# Patient Record
Sex: MALE | Race: WHITE | Employment: UNEMPLOYED
[De-identification: names, ages, dates, MRNs, and addresses within clinical notes are randomized per-mention and may not be internally consistent; named-entity substitution may affect disease eponyms.]

---
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
## 2021-02-05 ENCOUNTER — TELEMEDICINE (OUTPATIENT)
Dept: PEDIATRICS CLINIC | Age: 21
End: 2021-02-05

## 2021-02-05 DIAGNOSIS — R43.0 LOSS OF SMELL: ICD-10-CM

## 2021-02-05 DIAGNOSIS — R43.2 LOSS OF TASTE: ICD-10-CM

## 2021-02-05 DIAGNOSIS — F43.23 ADJUSTMENT DISORDER WITH MIXED ANXIETY AND DEPRESSED MOOD: Primary | ICD-10-CM

## 2021-02-05 DIAGNOSIS — J30.2 SEASONAL ALLERGIES: ICD-10-CM

## 2021-02-05 PROCEDURE — 99212 OFFICE O/P EST SF 10 MIN: CPT | Performed by: NURSE PRACTITIONER

## 2021-02-05 RX ORDER — SERTRALINE HYDROCHLORIDE 100 MG/1
100 TABLET, FILM COATED ORAL DAILY
Qty: 30 TABLET | Refills: 3 | Status: SHIPPED | OUTPATIENT
Start: 2021-02-05 | End: 2021-07-09 | Stop reason: SDUPTHER

## 2021-02-05 RX ORDER — FLUTICASONE PROPIONATE 50 MCG
2 SPRAY, SUSPENSION (ML) NASAL DAILY
Qty: 1 BOTTLE | Refills: 3 | Status: SHIPPED | OUTPATIENT
Start: 2021-02-05

## 2021-02-05 ASSESSMENT — ENCOUNTER SYMPTOMS: ABDOMINAL PAIN: 0
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
## 2021-04-01 ENCOUNTER — TELEMEDICINE (OUTPATIENT)
Dept: PEDIATRICS CLINIC | Age: 21
End: 2021-04-01
Payer: COMMERCIAL

## 2021-04-01 DIAGNOSIS — F43.23 ADJUSTMENT DISORDER WITH MIXED ANXIETY AND DEPRESSED MOOD: Primary | ICD-10-CM

## 2021-04-01 DIAGNOSIS — G47.9 SLEEPING DIFFICULTY: ICD-10-CM

## 2021-04-01 DIAGNOSIS — Z91.09 ENVIRONMENTAL ALLERGIES: ICD-10-CM

## 2021-04-01 DIAGNOSIS — L70.0 ACNE VULGARIS: ICD-10-CM

## 2021-04-01 PROCEDURE — 99213 OFFICE O/P EST LOW 20 MIN: CPT | Performed by: NURSE PRACTITIONER

## 2021-04-01 PROCEDURE — G8428 CUR MEDS NOT DOCUMENT: HCPCS | Performed by: NURSE PRACTITIONER

## 2021-04-01 ASSESSMENT — ENCOUNTER SYMPTOMS: ABDOMINAL PAIN: 0
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
## 2021-07-09 ENCOUNTER — TELEMEDICINE (OUTPATIENT)
Dept: PEDIATRICS CLINIC | Age: 21
End: 2021-07-09
Payer: COMMERCIAL

## 2021-07-09 DIAGNOSIS — L70.0 ACNE VULGARIS: ICD-10-CM

## 2021-07-09 DIAGNOSIS — F43.23 ADJUSTMENT DISORDER WITH MIXED ANXIETY AND DEPRESSED MOOD: ICD-10-CM

## 2021-07-09 DIAGNOSIS — Z91.09 ENVIRONMENTAL ALLERGIES: ICD-10-CM

## 2021-07-09 PROCEDURE — 99213 OFFICE O/P EST LOW 20 MIN: CPT | Performed by: NURSE PRACTITIONER

## 2021-07-09 PROCEDURE — G8427 DOCREV CUR MEDS BY ELIG CLIN: HCPCS | Performed by: NURSE PRACTITIONER

## 2021-07-09 RX ORDER — MONTELUKAST SODIUM 10 MG/1
10 TABLET ORAL NIGHTLY
Qty: 90 TABLET | Refills: 1 | Status: SHIPPED | OUTPATIENT
Start: 2021-07-09

## 2021-07-09 RX ORDER — HYDROXYZINE PAMOATE 25 MG/1
CAPSULE ORAL
Qty: 60 CAPSULE | Refills: 3 | Status: SHIPPED | OUTPATIENT
Start: 2021-07-09 | End: 2022-07-07

## 2021-07-09 RX ORDER — DOXYCYCLINE HYCLATE 100 MG/1
CAPSULE ORAL
Qty: 30 CAPSULE | Refills: 2 | Status: SHIPPED | OUTPATIENT
Start: 2021-07-09 | End: 2022-06-06

## 2021-07-09 RX ORDER — SERTRALINE HYDROCHLORIDE 100 MG/1
100 TABLET, FILM COATED ORAL DAILY
Qty: 30 TABLET | Refills: 3 | Status: SHIPPED | OUTPATIENT
Start: 2021-07-09 | End: 2022-08-29

## 2021-08-01 ASSESSMENT — ENCOUNTER SYMPTOMS: ABDOMINAL PAIN: 0

## 2021-10-11 ENCOUNTER — HOSPITAL ENCOUNTER (OUTPATIENT)
Age: 21
Setting detail: SPECIMEN
Discharge: HOME | End: 2021-10-11
Payer: COMMERCIAL

## 2021-10-11 ENCOUNTER — OFFICE VISIT (OUTPATIENT)
Dept: PEDIATRICS CLINIC | Age: 21
End: 2021-10-11
Payer: COMMERCIAL

## 2021-10-11 VITALS
DIASTOLIC BLOOD PRESSURE: 82 MMHG | HEIGHT: 69 IN | HEART RATE: 92 BPM | TEMPERATURE: 97.6 F | BODY MASS INDEX: 28.66 KG/M2 | WEIGHT: 193.5 LBS | SYSTOLIC BLOOD PRESSURE: 136 MMHG

## 2021-10-11 DIAGNOSIS — J45.30 MILD PERSISTENT REACTIVE AIRWAY DISEASE WITHOUT COMPLICATION: ICD-10-CM

## 2021-10-11 DIAGNOSIS — Z23 NEED FOR VACCINATION: ICD-10-CM

## 2021-10-11 DIAGNOSIS — Z91.09 ENVIRONMENTAL ALLERGIES: ICD-10-CM

## 2021-10-11 DIAGNOSIS — Z00.129 HEALTH CHECK FOR CHILD OVER 28 DAYS OLD: Primary | ICD-10-CM

## 2021-10-11 DIAGNOSIS — Z00.129 HEALTH CHECK FOR CHILD OVER 28 DAYS OLD: ICD-10-CM

## 2021-10-11 DIAGNOSIS — G47.9 SLEEPING DIFFICULTY: ICD-10-CM

## 2021-10-11 DIAGNOSIS — F43.23 ADJUSTMENT DISORDER WITH MIXED ANXIETY AND DEPRESSED MOOD: ICD-10-CM

## 2021-10-11 DIAGNOSIS — Z97.3 WEARS GLASSES: ICD-10-CM

## 2021-10-11 PROCEDURE — G8484 FLU IMMUNIZE NO ADMIN: HCPCS | Performed by: NURSE PRACTITIONER

## 2021-10-11 PROCEDURE — 90471 IMMUNIZATION ADMIN: CPT | Performed by: NURSE PRACTITIONER

## 2021-10-11 PROCEDURE — 99395 PREV VISIT EST AGE 18-39: CPT | Performed by: NURSE PRACTITIONER

## 2021-10-11 PROCEDURE — 90620 MENB-4C VACCINE IM: CPT | Performed by: NURSE PRACTITIONER

## 2021-10-11 ASSESSMENT — PATIENT HEALTH QUESTIONNAIRE - PHQ9
SUM OF ALL RESPONSES TO PHQ QUESTIONS 1-9: 2
SUM OF ALL RESPONSES TO PHQ QUESTIONS 1-9: 2
SUM OF ALL RESPONSES TO PHQ9 QUESTIONS 1 & 2: 2
SUM OF ALL RESPONSES TO PHQ QUESTIONS 1-9: 2
2. FEELING DOWN, DEPRESSED OR HOPELESS: 1
1. LITTLE INTEREST OR PLEASURE IN DOING THINGS: 1

## 2021-10-11 NOTE — PROGRESS NOTES
16+ WELL CHILD VISIT    Corina Murray is a 22 y.o. male here for well child exam.     CURRENT PATIENT/PARENT CONCERNS    Medication check Pt on Zoloft 100 takes medication before bed around 8 pm       Forms?: No   School/work notes? :No   Refills? :No     Vision Screen  Pt wears corrective lenses     Depression Screen  PHQ-9 Total: 2    REVIEW OF LIFESTYLE  Who does the adolescent live with?: Mother and father. Has working smoke alarms at home?:  Yes  Carbon monoxide detectors in home?: Yes  Guns/weapons in the home?: no    Wears a seat belt in car?: Yes  Texting while driving? No  Drivers License? No  Wears sunscreen?: Yes  Sees the dentist regularly?: Yes    SCHOOL  Grade in school?: Graduated   School Performance/GPA?: Did well graduated 2020   Bullying others or being bullied at school?: No   Problems falling asleep or staying asleep, or snoring: yes, trouble falling asleep. DIET    Amount of sugary beverages (including pop and sports drinks with sugar per day?:  8-16 oz per day  Caffeine or Energy drinks? Soda   Servings of dairy per day?: 2-3   Eats a variety of food-fruit/meat/veg?:  Yes  Amount of daily physical activity?: 2+ hours   Types of daily physical activity engaged in ?: Works in a [employer]   Sports Physical required?: No    Screen need for lipid panel:   Family history of high cholesterol?: No   Family history of heart attack before the age of 48 years?: No   Family history of obesity or type 2 diabetes?: No   Family history of heart disease?: No       Birth History    Birth     Weight: 8 lb (3.629 kg)    Gestation Age: 44 wks     ROS  Constitutional:  Denies fever. Sleeping normally. Eyes:  Denies eye drainage or redness, no concerns for vision. HENT:  Denies nasal congestion or ear drainage, no concerns for hearing. Respiratory:  Denies cough or troubles breathing. Cardiovascular:  Denies chest pain, palpitations, lightheadedness, dizziness, headaches with exercise.    GI: Denies vomiting, bloody stools, constipation, or diarrhea. Adolescent has good appetite  :  Denies changes in urination. No blood noted. No dysuria, no discharge. Menses not applicable   Musculoskeletal:  Denies joint redness or swelling. Normal movement of extremities. Integument:  Denies rash or acne  Neurologic:  Denies focal weakness, no altered level of consciousness  Endocrine:  Denies polyuria, development of secondary sex characteristics yes  Lymphatic:  Denies swollen glands or edema. Psychosocial: Denies mood or depressive symptoms. Sexually active not sexually active. Recreational drug use denies all    PHYSICAL EXAM    Vital Signs:  /82 (Site: Right Upper Arm, Position: Sitting, Cuff Size: Large Adult)   Pulse 92   Temp 97.6 °F (36.4 °C) (Temporal)   Ht 5' 9.5\" (1.765 m)   Wt 193 lb 8 oz (87.8 kg)   BMI 28.17 kg/m²  Facility age limit for growth percentiles is 20 years. Facility age limit for growth percentiles is 20 years. Facility age limit for growth percentiles is 20 years. General:  Alert, interactive and appropriate, well nourished and well-appearing  Head:  Normocephalic, atraumatic. Eyes:  No drainage. Conjunctiva clear. Bilateral red reflex present. EOMs intact, PERRLA  Ears:  External ears normal, TM's normal.  Nose:  Nares normal, no drainage  Mouth:  Oropharynx normal, pink moist mucous membranes, skin intact, no lesions. Teeth/gums intact without abscess or caries  Neck:  Symmetric, supple, full range of motion, no tenderness, no masses, thyroid normal.  Chest:  Symmetrical  Respiratory:  Breathing not labored. Normal respiratory rate. Chest clear to auscultation. Heart:  Regular rate and rhythm, normal S1 and S2, femoral pulses full and symmetric. Brisk cap refill  Murmur:  no murmur noted  Abdomen:  Soft, nontender, nondistended, normal bowel sounds, no hepatosplenomegaly or abnormal masses.   Genitals:  genitalia not examined  Lymphatic:  No cervical, inguinal, or axillary adenopathy. Musculoskeletal:  Spine straight without scoliosis. Normal posture. Steady gait. Hips with normal and symmetric range of motion. Leg length symmetric. Skin:  No rashes, lesions, indurations, or cyanosis. Pink. Neuro:  Normal tone and movement bilaterally. CN 2-12 intact     Psychosocial: Parents interact well with adolescent, interested, asking appropriate questions      IMMUNES  Immunization History   Administered Date(s) Administered    COVID-19, J&J, PF, 0.5 mL 08/01/2021    DTaP 2001, 01/24/2002, 03/18/2002, 10/07/2003, 08/15/2006    HPV Quadrivalent (Gardasil) 09/15/2014, 11/17/2014, 03/18/2015    Hepatitis A 08/15/2014, 03/18/2015    Hepatitis B 2001, 03/18/2002, 10/07/2003    Hib, unspecified 2001, 01/24/2002, 03/18/2002, 10/07/2003    Influenza A (F2J4-27) Vaccine IM 11/04/2009, 12/16/2009    Influenza Virus Vaccine 09/15/2014, 10/05/2015, 11/23/2019    Influenza, Scherry Craze, IM, (6 mo and older Fluzone, Flulaval, Fluarix and 3 yrs and older Afluria) 10/05/2016    Influenza, Quadv, IM, PF (6 mo and older Fluzone, Flulaval, Fluarix, and 3 yrs and older Afluria) 02/14/2018, 10/08/2018    MMR 10/07/2002, 08/15/2006    Meningococcal B, OMV (Bexsero) 03/05/2018    Meningococcal MCV4P (Menactra) 08/15/2014, 03/05/2018    Pneumococcal Conjugate 7-valent (Prevnar7) 2001, 01/24/2002    Polio IPV (IPOL) 2001, 01/24/2002, 10/07/2003, 08/15/2006    Tdap (Boostrix, Adacel) 08/15/2014    Varicella (Varivax) 10/07/2003, 08/15/2014       IMPRESSION/PLAN  1. Health check for child over 34 days old    2. Need for vaccination    3. Adjustment disorder with mixed anxiety and depressed mood    4. Sleeping difficulty    5. Environmental allergies    6. Mild persistent reactive airway disease without complication    7.  Wears glasses        Healthy 22year old, looking into moving to [place] for school to take classes for comic book writing :)    Adjustment disorder with mixed anxiety and depression: Doing well on Zoloft 100 mg daily, he reports previously he was missing doses several days per week, but has been better with taking medication regularly and is feeling better. Continue current regimen, call with concerns    Fatigue: Reports he is sleeping well, about 8 hours per night, but feels tired. He is not napping, he is working 40 hours/week. Recommend to continue taking Zoloft regularly, if there is no improvement in fatigue over the next few weeks, we can consider obtaining lab work    Sleeping difficulty: Doing well with Vistaril 50 mg 1 hour prior to bed, call with concerns    Environmental allergies: Well-controlled on Singulair 10 mg nightly, Claritin 10 mg as needed, Flonase 1 spray to each nostril as needed    Influenza vaccine offered and declined. Parent advised of importance and recommendation of flu vaccine in all children, especially those with asthma. Discussed risks not being vaccinated, including severe illness and death. If parents reconsider at any time, we would be happy to schedule a nurse visit for them, please call for an appointment.      Next well child visit per routine in 1 year  Anticipatory guidance discussed or covered in handout given to family:   Helmet for bikes, skateboards, etc.   Limit screen time to < 2 hours daily   Healthy eating habits   Adequate exercise   Discipline   Drugs   Puberty   Secondary Sex Characteristics   Safe sex   No texting while driving   Seatbelt    -Obtain routine (non-fasting) lipid panel screening at 2621 years of age  -Consider fasting lipid panel based on family history, weight, and exam  -If BMI>85% with 2 risk factors (hypertension, acanthosis nigricans, family history of type 2 DM, high risk ethnicity) then consider fasting and post-prandial glucose/insulin level, ALT, AST  -Consider HGB screen for menstruating females and other at risk populations  -Consider STI screening for sexually active adolescents    Orders Placed This Encounter   Procedures    Chlamydia/GC DNA, Urine     Standing Status:   Future     Standing Expiration Date:   10/11/2022    Meningococcal B, OMV (age 6y-22y) IM Magen Salas)     Results for orders placed or performed in visit on 11/11/19   POCT rapid strep A   Result Value Ref Range    Strep A Ag None Detected None Detected     Return in about 3 months (around 1/11/2022) for med check. I have reviewed and agree with documentation per clinical staff, and have made any necessary adjustments.   Electronically signed by RADHA Yoo CNP on 10/11/2021 at 2:40 PM (Please note that portions of this note were completed with a voice recognition program. Efforts were made to edit the dictations, but occasionally words are mis-transcribed.)

## 2021-10-12 LAB
C. TRACHOMATIS DNA ,URINE: NEGATIVE
N. GONORRHOEAE DNA, URINE: NEGATIVE
SPECIMEN DESCRIPTION: NORMAL

## 2021-12-06 ENCOUNTER — OFFICE VISIT (OUTPATIENT)
Dept: FAMILY MEDICINE CLINIC | Age: 21
End: 2021-12-06
Payer: COMMERCIAL

## 2021-12-06 ENCOUNTER — HOSPITAL ENCOUNTER (OUTPATIENT)
Age: 21
Setting detail: SPECIMEN
Discharge: HOME | End: 2021-12-06

## 2021-12-06 VITALS
TEMPERATURE: 97.2 F | WEIGHT: 193 LBS | OXYGEN SATURATION: 98 % | BODY MASS INDEX: 28.1 KG/M2 | RESPIRATION RATE: 11 BRPM | HEART RATE: 103 BPM | DIASTOLIC BLOOD PRESSURE: 88 MMHG | SYSTOLIC BLOOD PRESSURE: 138 MMHG

## 2021-12-06 DIAGNOSIS — Z11.52 ENCOUNTER FOR SCREENING FOR COVID-19: ICD-10-CM

## 2021-12-06 DIAGNOSIS — J06.9 VIRAL URI WITH COUGH: Primary | ICD-10-CM

## 2021-12-06 PROCEDURE — 1036F TOBACCO NON-USER: CPT

## 2021-12-06 PROCEDURE — 99212 OFFICE O/P EST SF 10 MIN: CPT

## 2021-12-06 PROCEDURE — G8419 CALC BMI OUT NRM PARAM NOF/U: HCPCS

## 2021-12-06 PROCEDURE — G8427 DOCREV CUR MEDS BY ELIG CLIN: HCPCS

## 2021-12-06 PROCEDURE — G8484 FLU IMMUNIZE NO ADMIN: HCPCS

## 2021-12-06 RX ORDER — BENZONATATE 100 MG/1
100 CAPSULE ORAL 3 TIMES DAILY PRN
Qty: 30 CAPSULE | Refills: 0 | Status: SHIPPED | OUTPATIENT
Start: 2021-12-06 | End: 2021-12-13

## 2021-12-06 ASSESSMENT — ENCOUNTER SYMPTOMS
VOMITING: 0
CHEST TIGHTNESS: 1
NAUSEA: 1
COUGH: 1
SORE THROAT: 1
RHINORRHEA: 1
SHORTNESS OF BREATH: 1
DIARRHEA: 0
EYE DISCHARGE: 0

## 2021-12-06 NOTE — PROGRESS NOTES
[address] WALK-IN  [address]    [address] [facility]  [address] [facility]  [address]  [address] [address]  Dept: [phone]    Javan Garg is a 22 y.o. male Established patient, who presents to the walk-in clinic today with conditions/complaints as noted below:    Chief Complaint   Patient presents with    Sinus Problem     sent home from work today. onset 2 days.  Congestion     needs covid test to return to work.  Cough         HPI:     URI   This is a new problem. The current episode started in the past 7 days (on Thursday). The problem has been gradually worsening. Maximum temperature: subjective. Associated symptoms include congestion, coughing, headaches, nausea, rhinorrhea and a sore throat. Pertinent negatives include no diarrhea, ear pain, rash or vomiting. Treatments tried: DayQuil, Nyquil. The treatment provided mild relief.        Past Medical History:   Diagnosis Date    Asthma     Seasonal allergies     Vision abnormalities        Current Outpatient Medications   Medication Sig Dispense Refill    benzonatate (TESSALON) 100 MG capsule Take 1 capsule by mouth 3 times daily as needed for Cough 30 capsule 0    sertraline (ZOLOFT) 100 MG tablet Take 1 tablet by mouth daily 30 tablet 3    doxycycline hyclate (VIBRAMYCIN) 100 MG capsule TAKE ONE CAPSULE BY MOUTH DAILY 30 capsule 2    hydrOXYzine (VISTARIL) 25 MG capsule TAKE ONE TO TWO CAPSULES BY MOUTH THREE TIMES A DAY AS NEEDED FOR ANXIETY MAX OF FOUR CAPSULES PER DAY 60 capsule 3    montelukast (SINGULAIR) 10 MG tablet Take 1 tablet by mouth nightly 90 tablet 1    fluticasone (FLONASE) 50 MCG/ACT nasal spray 2 sprays by Nasal route daily 1 Bottle 3    Multiple Vitamin (MULTI-VITAMINS PO) Take by mouth      Ascorbic Acid (VITAMIN C PO) Take by mouth      albuterol sulfate  (90 Base) MCG/ACT inhaler Inhale 2 puffs into the lungs warm and dry. Findings: No rash. Neurological:      General: No focal deficit present. Mental Status: He is alert and oriented to person, place, and time. Psychiatric:         Attention and Perception: Attention normal.         Mood and Affect: Mood normal.         Speech: Speech normal.         Behavior: Behavior normal. Behavior is cooperative.           :          1. Viral URI with cough  -     benzonatate (TESSALON) 100 MG capsule; Take 1 capsule by mouth 3 times daily as needed for Cough, Disp-30 capsule, R-0Normal  2. Encounter for screening for COVID-19  -     COVID-19; Future  -     XR CHEST (2 VW); Future       :      Return if symptoms worsen or fail to improve. Orders Placed This Encounter   Medications    benzonatate (TESSALON) 100 MG capsule     Sig: Take 1 capsule by mouth 3 times daily as needed for Cough     Dispense:  30 capsule     Refill:  0     Patient has received the J&J COVID-19 vaccine. Due to coinciding symptoms, advised testing. Instructed to quarantine pending test results. Work note provided. Imaging ordered, instructed to have completed with worsening symptoms. Advised to continue with symptomatic treatment. Increase fluid intake and rest.  May use cough suppressant as needed. Use Tylenol or Motrin as needed for fevers or headaches. Recommend using the albuterol inhaler as needed for shortness of breath or chest tightness. Follow-up with PCP as needed. Patient and/or parent given educational materials - see patient instructions. Discussed use, benefit, and side effects of prescribed medications. All patient questions answered. Patient and/or parent voiced understanding.       Electronically signed by RADHA Cardona 12/6/2021 at 12:07 PM

## 2021-12-06 NOTE — PATIENT INSTRUCTIONS
Read the labels to make sure that you are not taking more than the recommended dose. Too much acetaminophen (Tylenol) can be harmful. · Get plenty of rest.  · Do not smoke or allow others to smoke around you. If you need help quitting, talk to your doctor about stop-smoking programs and medicines. These can increase your chances of quitting for good. When should you call for help? Call 911 anytime you think you may need emergency care. For example, call if:    · You have severe trouble breathing. Call your doctor now or seek immediate medical care if:    · You seem to be getting much sicker.     · You have new or worse trouble breathing.     · You have a new or higher fever.     · You have a new rash. Watch closely for changes in your health, and be sure to contact your doctor if:    · You have a new symptom, such as a sore throat, an earache, or sinus pain.     · You cough more deeply or more often, especially if you notice more mucus or a change in the color of your mucus.     · You do not get better as expected. Where can you learn more? Go to [email or web address] and sign in to your [employer] account. Enter L250 in the [employer]uber box to learn more about \"Upper Respiratory Infection (Cold): Care Instructions. \"     If you do not have an account, please click on the \"Sign Up Now\" link. Current as of: July 6, 2021               Content Version: 13.0  © 8481-9863 Healthwise, Incorporated. Care instructions adapted under license by [facility] ([facility]). If you have questions about a medical condition or this instruction, always ask your healthcare professional. [place] [address] any warranty or liability for your use of this information.

## 2021-12-06 NOTE — LETTER
[address] [facility]  [address]  [facility] [address]  Phone: [phone]  Fax: [phone]    RADHA David CNP        December 6, 2021     Patient: Ifrah Hopkins   YOB: 2000   Date of Visit: 12/6/2021       To Whom It May Concern: It is my medical opinion that Marjorie Kramer should remain out of work until Wm. Navdeep Dumont are back. If you have any questions or concerns, please don't hesitate to call.     Sincerely,        RADHA Hammond - CNP

## 2021-12-07 LAB
SARS-COV-2: NORMAL
SARS-COV-2: NOT DETECTED
SOURCE: NORMAL

## 2022-06-05 DIAGNOSIS — L70.0 ACNE VULGARIS: ICD-10-CM

## 2022-06-06 RX ORDER — DOXYCYCLINE HYCLATE 100 MG/1
CAPSULE ORAL
Qty: 30 CAPSULE | Refills: 2 | Status: SHIPPED | OUTPATIENT
Start: 2022-06-06

## 2022-07-06 DIAGNOSIS — F43.23 ADJUSTMENT DISORDER WITH MIXED ANXIETY AND DEPRESSED MOOD: ICD-10-CM

## 2022-07-11 RX ORDER — HYDROXYZINE PAMOATE 25 MG/1
CAPSULE ORAL
Qty: 60 CAPSULE | Refills: 3 | Status: SHIPPED | OUTPATIENT
Start: 2022-07-11

## 2022-08-22 DIAGNOSIS — F43.23 ADJUSTMENT DISORDER WITH MIXED ANXIETY AND DEPRESSED MOOD: ICD-10-CM

## 2022-08-23 NOTE — TELEPHONE ENCOUNTER
LOV 10/11/2021. Patient does not currently have insurance and doesn't think he will be able to get insurance until beginning of next year. He needs a med check. His well visit is due in October. How would you like to proceed? Medication pending.

## 2022-08-29 RX ORDER — SERTRALINE HYDROCHLORIDE 100 MG/1
TABLET, FILM COATED ORAL
Qty: 30 TABLET | Refills: 3 | Status: SHIPPED | OUTPATIENT
Start: 2022-08-29

## 2023-02-13 ENCOUNTER — HOSPITAL ENCOUNTER (EMERGENCY)
Age: 23
Discharge: HOME | End: 2023-02-13
Attending: EMERGENCY MEDICINE

## 2023-02-13 ENCOUNTER — APPOINTMENT (OUTPATIENT)
Dept: GENERAL RADIOLOGY | Age: 23
End: 2023-02-13

## 2023-02-13 VITALS
BODY MASS INDEX: 29.62 KG/M2 | SYSTOLIC BLOOD PRESSURE: 171 MMHG | RESPIRATION RATE: 18 BRPM | DIASTOLIC BLOOD PRESSURE: 105 MMHG | OXYGEN SATURATION: 99 % | HEART RATE: 92 BPM | WEIGHT: 200 LBS | TEMPERATURE: 98.5 F | HEIGHT: 69 IN

## 2023-02-13 DIAGNOSIS — M54.50 ACUTE MIDLINE LOW BACK PAIN WITHOUT SCIATICA: Primary | ICD-10-CM

## 2023-02-13 PROCEDURE — 72100 X-RAY EXAM L-S SPINE 2/3 VWS: CPT

## 2023-02-13 PROCEDURE — 6360000002 HC RX W HCPCS: Performed by: EMERGENCY MEDICINE

## 2023-02-13 PROCEDURE — 99284 EMERGENCY DEPT VISIT MOD MDM: CPT

## 2023-02-13 PROCEDURE — 96372 THER/PROPH/DIAG INJ SC/IM: CPT

## 2023-02-13 RX ORDER — KETOROLAC TROMETHAMINE 30 MG/ML
30 INJECTION, SOLUTION INTRAMUSCULAR; INTRAVENOUS ONCE
Status: COMPLETED | OUTPATIENT
Start: 2023-02-13 | End: 2023-02-13

## 2023-02-13 RX ORDER — IBUPROFEN 800 MG/1
800 TABLET ORAL EVERY 8 HOURS PRN
Qty: 30 TABLET | Refills: 0 | Status: SHIPPED | OUTPATIENT
Start: 2023-02-13

## 2023-02-13 RX ORDER — CYCLOBENZAPRINE HCL 10 MG
10 TABLET ORAL 3 TIMES DAILY PRN
Qty: 9 TABLET | Refills: 0 | Status: SHIPPED | OUTPATIENT
Start: 2023-02-13 | End: 2023-02-16

## 2023-02-13 RX ADMIN — KETOROLAC TROMETHAMINE 30 MG: 30 INJECTION, SOLUTION INTRAMUSCULAR; INTRAVENOUS at 07:54

## 2023-02-13 ASSESSMENT — PAIN SCALES - GENERAL
PAINLEVEL_OUTOF10: 6
PAINLEVEL_OUTOF10: 10

## 2023-02-13 ASSESSMENT — PAIN DESCRIPTION - LOCATION: LOCATION: BACK

## 2023-02-13 ASSESSMENT — PAIN DESCRIPTION - PAIN TYPE: TYPE: ACUTE PAIN

## 2023-02-13 ASSESSMENT — PAIN - FUNCTIONAL ASSESSMENT: PAIN_FUNCTIONAL_ASSESSMENT: 0-10

## 2023-02-13 NOTE — Clinical Note
Sacha Velazco was seen and treated in our emergency department on 2/13/2023. He may return to work on 02/14/2023. If you have any questions or concerns, please don't hesitate to call.       Nelson Flores MD

## 2023-02-13 NOTE — ED PROVIDER NOTES
[address] [facility] Emergency Department  [id number] [facility] [address] [facility] [address]  Phone: [phone]  Fax: [phone]      Pt Name: Ifrah Hopkins  [id number]  [place] 2000  Date of evaluation: 2/13/2023      CHIEF COMPLAINT       Chief Complaint   Patient presents with    Back Pain     Pt states he fell down the stairs on Saturday, back has been hurting since. HISTORY OF PRESENT ILLNESS   20-year-old male presents to the emergency department today complaining of lower back pain. This past Saturday he was walking downstairs with a stair broke he fell landing on his lower back. He slightly slid down the stairs as well. Did not strike his head. He reports since then the pain is only become worse. Pain is worse with motion and better when he is not moving. He denies any saddle paresthesias. No problems bowel or bladder. He took a over-the-counter \"pain pill\" and tried Biofreeze last night without improvement. He therefore presents here. Is been no other contemporaneous evaluation or management of the symptoms prior to arrival.    REVIEWOF SYSTEMS     Review of Systems   All other systems reviewed and are negative. PAST MEDICAL HISTORY    has a past medical history of Asthma, Seasonal allergies, and Vision abnormalities. SURGICAL HISTORY      has a past surgical history that includes Tympanostomy tube placement.     CURRENTMEDICATIONS       Previous Medications    ALBUTEROL SULFATE  (90 BASE) MCG/ACT INHALER    Inhale 2 puffs into the lungs every 4 hours as needed for Wheezing or Shortness of Breath    ASCORBIC ACID (VITAMIN C PO)    Take by mouth    DOXYCYCLINE HYCLATE (VIBRAMYCIN) 100 MG CAPSULE    TAKE ONE CAPSULE BY MOUTH DAILY    FLUTICASONE (FLONASE) 50 MCG/ACT NASAL SPRAY    2 sprays by Nasal route daily    HYDROXYZINE PAMOATE (VISTARIL) 25 MG CAPSULE    TAKE ONE TO TWO CAPSULES BY MOUTH THREE TIMES A DAY AS NEEDED FOR ANXIETY **MAX OF FOUR CAPSULES PER DAY**    LORATADINE (CLARITIN) 10 MG CAPS    Take  by mouth. MONTELUKAST (SINGULAIR) 10 MG TABLET    Take 1 tablet by mouth nightly    MULTIPLE VITAMIN (MULTI-VITAMINS PO)    Take by mouth    SERTRALINE (ZOLOFT) 100 MG TABLET    TAKE ONE TABLET BY MOUTH DAILY       ALLERGIES     is allergic to zithromax [azithromycin]. FAMILY HISTORY     He indicated that his mother is alive. He indicated that his father is alive. He indicated that the status of his sister is unknown. He indicated that his maternal grandmother is . He indicated that his maternal grandfather is alive. He indicated that his paternal grandmother is . He indicated that his paternal grandfather is . He indicated that the status of his maternal aunt is unknown. He indicated that the status of his paternal uncle is unknown.     family history includes Arthritis in his father, paternal grandfather, and paternal grandmother; Asthma in his paternal uncle; Diabetes in his father, mother, paternal grandfather, and paternal grandmother; Heart Disease in his maternal grandfather and paternal grandmother; High Blood Pressure in his father, maternal aunt, paternal grandfather, and paternal grandmother; High Cholesterol in his paternal grandmother; Seizures in his paternal grandfather; Substance Abuse in his father and paternal uncle; Vision Loss in his father, maternal aunt, mother, paternal grandfather, paternal grandmother, and sister. SOCIAL HISTORY      reports that he has never smoked. He has never used smokeless tobacco. He reports that he does not drink alcohol and does not use drugs. PHYSICAL EXAM     INITIAL VITALS:  height is 5' 9\" (1.753 m) and weight is 90.7 kg (200 lb). His oral temperature is 98.5 °F (36.9 °C). His blood pressure is 171/105 (abnormal) and his pulse is 92. His respiration is 18 and oxygen saturation is 99%. Physical Exam  Vitals reviewed. Constitutional:       Appearance: He is well-developed. HENT:      Head: Normocephalic and atraumatic. Eyes:      Conjunctiva/sclera: Conjunctivae normal.      Pupils: Pupils are equal, round, and reactive to light. Neck:      Trachea: No tracheal deviation. Cardiovascular:      Rate and Rhythm: Normal rate and regular rhythm. Pulmonary:      Effort: Pulmonary effort is normal.      Breath sounds: Normal breath sounds. Abdominal:      General: Bowel sounds are normal. There is no distension. Palpations: Abdomen is soft. Tenderness: There is no abdominal tenderness. Musculoskeletal:         General: No tenderness. Normal range of motion. Cervical back: Normal range of motion and neck supple. Skin:     General: Skin is warm and dry. Findings: No rash. Neurological:      Mental Status: He is alert and oriented to person, place, and time. Psychiatric:         Behavior: Behavior normal.         Thought Content: Thought content normal.         Judgment: Judgment normal.         MDM:   Patient does not have any saddle paresthesias. He is neurovascularly intact. He will be given Toradol and x-ray has been ordered. XR LUMBAR SPINE (2-3 VIEWS)    Result Date: 2/13/2023  EXAMINATION: 3 XRAY VIEWS OF THE LUMBAR SPINE 2/13/2023 8:20 am COMPARISON: None. HISTORY: ORDERING SYSTEM PROVIDED HISTORY: back pain TECHNOLOGIST PROVIDED HISTORY: back pain Reason for Exam: Lower back pain from a fall on 2/11/23. Pt fell walking downstairs and landed on his lower back. 50-year-old male with lower back pain after a fall FINDINGS: Mild levo convex curvature of the lumbar spine. Moderate stool burden. Psoas shadows symmetric in appearance. Bilateral SI joints appear patent. Visualized sacral arcuate lines appear intact. Lumbar spine imaged from the T11 vertebral body level to the sacrococcygeal junction on the lateral views. Gross preservation of the vertebral body heights and intervertebral disc spaces. Pedicles symmetric in appearance.      1. Mild lumbar levoconvex curvature. 2. No acute vertebral body height loss in the lumbar spine. Patient states that he is improved on reevaluation. I will treat him supportively. He is requesting a work note. 1 has been provided. He will be discharged home. The patient was given the following medications:  Orders Placed This Encounter   Medications    ketorolac (TORADOL) injection 30 mg    ibuprofen (ADVIL;MOTRIN) 800 MG tablet     Sig: Take 1 tablet by mouth every 8 hours as needed for Pain     Dispense:  30 tablet     Refill:  0    cyclobenzaprine (FLEXERIL) 10 MG tablet     Sig: Take 1 tablet by mouth 3 times daily as needed for Muscle spasms     Dispense:  9 tablet     Refill:  0          FINAL IMPRESSION      1.  Acute midline low back pain without sciatica          DISPOSITION/PLAN   DISPOSITION Decision To Discharge 02/13/2023 08:55:37 AM      Condition on Disposition  Good    PATIENT REFERRED TO:  Susa Kawasaki, RADHA - CNP  [place] [address]  [phone]    Schedule an appointment as soon as possible for a visit in 2 days      DISCHARGE MEDICATIONS:  New Prescriptions    CYCLOBENZAPRINE (FLEXERIL) 10 MG TABLET    Take 1 tablet by mouth 3 times daily as needed for Muscle spasms    IBUPROFEN (ADVIL;MOTRIN) 800 MG TABLET    Take 1 tablet by mouth every 8 hours as needed for Pain       (Please note that portions of this note werecompleted with a voice recognition program.  Efforts were made to edit the dictations but occasionally words are mis-transcribed.)    Davonte Steward MD MD, F.A.C.E.P, F.A.A.E.M  Emergency Physician Attending          Davonte Steward MD  02/13/23 [id number]